# Patient Record
Sex: MALE | HISPANIC OR LATINO | Employment: FULL TIME | URBAN - METROPOLITAN AREA
[De-identification: names, ages, dates, MRNs, and addresses within clinical notes are randomized per-mention and may not be internally consistent; named-entity substitution may affect disease eponyms.]

---

## 2018-11-12 ENCOUNTER — OFFICE VISIT (OUTPATIENT)
Dept: URGENT CARE | Facility: PHYSICIAN GROUP | Age: 28
End: 2018-11-12
Payer: COMMERCIAL

## 2018-11-12 VITALS
BODY MASS INDEX: 24.34 KG/M2 | TEMPERATURE: 97.5 F | SYSTOLIC BLOOD PRESSURE: 124 MMHG | HEART RATE: 87 BPM | RESPIRATION RATE: 16 BRPM | HEIGHT: 70 IN | WEIGHT: 170 LBS | DIASTOLIC BLOOD PRESSURE: 68 MMHG | OXYGEN SATURATION: 95 %

## 2018-11-12 DIAGNOSIS — E10.8 TYPE 1 DIABETES MELLITUS WITH COMPLICATION (HCC): ICD-10-CM

## 2018-11-12 PROCEDURE — 99202 OFFICE O/P NEW SF 15 MIN: CPT | Performed by: EMERGENCY MEDICINE

## 2018-11-12 RX ORDER — INSULIN GLARGINE 100 [IU]/ML
20 INJECTION, SOLUTION SUBCUTANEOUS
Qty: 10 ML | Refills: 2 | Status: SHIPPED | OUTPATIENT
Start: 2018-11-12 | End: 2019-02-07 | Stop reason: SDUPTHER

## 2018-11-12 ASSESSMENT — ENCOUNTER SYMPTOMS
NAUSEA: 0
ABDOMINAL PAIN: 0
CHILLS: 0
COUGH: 0
EYE DISCHARGE: 0
VOMITING: 0
DIARRHEA: 0
SENSORY CHANGE: 0
MYALGIAS: 0
FEVER: 0
EYE REDNESS: 0
SORE THROAT: 0
NERVOUS/ANXIOUS: 0

## 2018-11-12 NOTE — PROGRESS NOTES
"Subjective:      Vamsi Mccullough is a 28 y.o. male who presents with Medication Refill (Insulin 100 unit sub-Q refill for diabetes )            HPI  Patient is a 28-year-old male recently relocated to work for [employer], chest got insurance and needs his insulin medications refilled.  States that he has not run out of them today nor does he have any symptoms.    PMH:  has a past medical history of Diabetes ([facility]).  MEDS:   Current Outpatient Prescriptions:   •  Insulin Lispro (HUMALOG) 100 UNIT/ML Solution Cartridge, Inject  as instructed., Disp: , Rfl:   •  insulin lispro (HUMALOG) 100 UNIT/ML Solution, Inject 5 Units as instructed 3 times a day before meals. Administer according to sliding scale, Disp: 10 mL, Rfl: 2  •  insulin glargine (LANTUS) 100 UNIT/ML Solution, Inject 20 Units as instructed every bedtime., Disp: 10 mL, Rfl: 2  ALLERGIES: No Known Allergies  SURGHX: History reviewed. No pertinent surgical history.  SOCHX:  reports that he has never smoked. He has never used smokeless tobacco.  FH: family history is not on file.  Review of Systems   Constitutional: Negative for chills and fever.   HENT: Negative for sore throat.    Eyes: Negative for discharge and redness.   Respiratory: Negative for cough.    Cardiovascular: Negative for chest pain.   Gastrointestinal: Negative for abdominal pain, diarrhea, nausea and vomiting.   Musculoskeletal: Negative for myalgias.   Skin: Negative for rash.   Neurological: Negative for sensory change.   Psychiatric/Behavioral: The patient is not nervous/anxious.           Objective:     /68 (BP Location: Left arm, Patient Position: Sitting, BP Cuff Size: Adult)   Pulse 87   Temp 36.4 °C (97.5 °F) (Temporal)   Resp 16   Ht 1.778 m (5' 10\")   Wt 77.1 kg (170 lb)   SpO2 95%   BMI 24.39 kg/m²      Physical Exam   Constitutional: He appears well-developed and well-nourished. No distress.   HENT:   Head: Normocephalic and atraumatic.   Right Ear: External ear normal. "   Left Ear: External ear normal.   Eyes: Conjunctivae are normal.   Cardiovascular: Normal rate.    Pulmonary/Chest: Effort normal.   Musculoskeletal: Normal range of motion.   Neurological: He is alert.   Skin: Skin is warm and dry. No rash noted. He is not diaphoretic.   Psychiatric: He has a normal mood and affect. His behavior is normal.   Vitals reviewed.              Assessment/Plan:     1. Type 1 diabetes mellitus with complication (HCC)  2.  Medication refill.    Patient was given a month's worth of medication plus a refill will find himself up with the primary care provider as soon as possible.  Return if any issues develop  - insulin lispro (HUMALOG) 100 UNIT/ML Solution; Inject 5 Units as instructed 3 times a day before meals. Administer according to sliding scale  Dispense: 10 mL; Refill: 2  - insulin glargine (LANTUS) 100 UNIT/ML Solution; Inject 20 Units as instructed every bedtime.  Dispense: 10 mL; Refill: 2

## 2019-02-07 ENCOUNTER — OFFICE VISIT (OUTPATIENT)
Dept: URGENT CARE | Facility: PHYSICIAN GROUP | Age: 29
End: 2019-02-07
Payer: COMMERCIAL

## 2019-02-07 VITALS
TEMPERATURE: 97.3 F | SYSTOLIC BLOOD PRESSURE: 116 MMHG | WEIGHT: 170 LBS | OXYGEN SATURATION: 97 % | HEART RATE: 82 BPM | RESPIRATION RATE: 16 BRPM | DIASTOLIC BLOOD PRESSURE: 82 MMHG | HEIGHT: 70 IN | BODY MASS INDEX: 24.34 KG/M2

## 2019-02-07 DIAGNOSIS — E10.8 TYPE 1 DIABETES MELLITUS WITH COMPLICATION (HCC): ICD-10-CM

## 2019-02-07 PROCEDURE — 99213 OFFICE O/P EST LOW 20 MIN: CPT | Performed by: FAMILY MEDICINE

## 2019-02-07 RX ORDER — INSULIN GLARGINE 100 [IU]/ML
20 INJECTION, SOLUTION SUBCUTANEOUS
Qty: 10 ML | Refills: 2 | Status: SHIPPED | OUTPATIENT
Start: 2019-02-07

## 2019-02-07 NOTE — PROGRESS NOTES
"Subjective:      Vamsi Mccullough is a 28 y.o. male who presents with Medication Refill (needs insuline refilled )  he needs refill. Has not established care yet          HPI    ROS       Objective:     /82 (BP Location: Right arm, Patient Position: Sitting, BP Cuff Size: Adult)   Pulse 82   Temp 36.3 °C (97.3 °F) (Tympanic)   Resp 16   Ht 1.778 m (5' 10\")   Wt 77.1 kg (170 lb)   SpO2 97%   BMI 24.39 kg/m²      Physical Exam   Constitutional: He is oriented to person, place, and time. He appears well-developed and well-nourished. No distress.   HENT:   Head: Normocephalic and atraumatic.   Eyes: Conjunctivae are normal.   Cardiovascular: Normal rate.    Pulmonary/Chest: Effort normal. No respiratory distress.   Neurological: He is alert and oriented to person, place, and time.   Skin: He is not diaphoretic. No pallor.               Assessment/Plan:     1. Type 1 diabetes mellitus with complication (HCC)  - insulin lispro (HUMALOG) 100 UNIT/ML Solution; Inject 5 Units as instructed 3 times a day before meals. Administer according to sliding scale  Dispense: 10 mL; Refill: 2  - insulin glargine (LANTUS) 100 UNIT/ML Solution; Inject 20 Units as instructed every bedtime.  Dispense: 10 mL; Refill: 2    Advised to establish care ASAP  "

## 2022-05-06 ENCOUNTER — OFFICE VISIT (OUTPATIENT)
Dept: URGENT CARE | Facility: PHYSICIAN GROUP | Age: 32
End: 2022-05-06
Payer: COMMERCIAL

## 2022-05-06 VITALS
HEART RATE: 84 BPM | WEIGHT: 182 LBS | OXYGEN SATURATION: 98 % | DIASTOLIC BLOOD PRESSURE: 60 MMHG | SYSTOLIC BLOOD PRESSURE: 134 MMHG | HEIGHT: 69 IN | BODY MASS INDEX: 26.96 KG/M2 | TEMPERATURE: 98.1 F | RESPIRATION RATE: 16 BRPM

## 2022-05-06 DIAGNOSIS — J22 ACUTE RESPIRATORY INFECTION: ICD-10-CM

## 2022-05-06 DIAGNOSIS — Z11.52 ENCOUNTER FOR SCREENING FOR COVID-19: ICD-10-CM

## 2022-05-06 DIAGNOSIS — R68.89 FLU-LIKE SYMPTOMS: ICD-10-CM

## 2022-05-06 LAB
EXTERNAL QUALITY CONTROL: NORMAL
FLUAV+FLUBV AG SPEC QL IA: NORMAL
INT CON NEG: NORMAL
INT CON POS: NORMAL
SARS-COV+SARS-COV-2 AG RESP QL IA.RAPID: NEGATIVE

## 2022-05-06 PROCEDURE — 87804 INFLUENZA ASSAY W/OPTIC: CPT | Performed by: FAMILY MEDICINE

## 2022-05-06 PROCEDURE — 87426 SARSCOV CORONAVIRUS AG IA: CPT | Performed by: FAMILY MEDICINE

## 2022-05-06 PROCEDURE — 99204 OFFICE O/P NEW MOD 45 MIN: CPT | Mod: CS | Performed by: FAMILY MEDICINE

## 2022-05-06 RX ORDER — INSULIN ASPART 100 [IU]/ML
INJECTION, SOLUTION INTRAVENOUS; SUBCUTANEOUS
COMMUNITY
Start: 2022-05-03

## 2022-05-06 RX ORDER — AZITHROMYCIN 250 MG/1
TABLET, FILM COATED ORAL
Qty: 6 TABLET | Refills: 0 | Status: SHIPPED | OUTPATIENT
Start: 2022-05-06 | End: 2022-05-11

## 2022-05-06 NOTE — LETTER
May 6, 2022         Patient: Vamsi Mccullough   YOB: 1990   Date of Visit: 5/6/2022           To Whom it May Concern:    Vamsi Mccullough was seen in my clinic on 5/6/2022.     Please excuse from work for 5/6 and 5/7/22 due to medical condition.    If you have any questions or concerns, please don't hesitate to call.        Sincerely,           Damon Chapa M.D.  Electronically Signed

## 2022-05-07 NOTE — PROGRESS NOTES
Chief Complaint:    Chief Complaint   Patient presents with   • Coronavirus Screening     Symptoms for 2 day       History of Present Illness:    Symptoms since 5/4/22. Has body aches, nasal symptoms with purulent mucus from nose, and sore throat. Occl spits out purulent mucus. No significant cough. Using Dayquil, Nyquil, and Ibuprofen for symptoms. Girlfriend seen here on 5/3/22, had negative Covid test here.      Past Medical History:    Past Medical History:   Diagnosis Date   • Diabetes (HCC)      Past Surgical History:    No past surgical history on file.    Social History:    Social History     Socioeconomic History   • Marital status:      Spouse name: Not on file   • Number of children: Not on file   • Years of education: Not on file   • Highest education level: Not on file   Occupational History   • Not on file   Tobacco Use   • Smoking status: Never Smoker   • Smokeless tobacco: Never Used   Substance and Sexual Activity   • Alcohol use: Not on file   • Drug use: Not on file   • Sexual activity: Not on file   Other Topics Concern   • Not on file   Social History Narrative   • Not on file     Social Determinants of Health     Financial Resource Strain: Not on file   Food Insecurity: Not on file   Transportation Needs: Not on file   Physical Activity: Not on file   Stress: Not on file   Social Connections: Not on file   Intimate Partner Violence: Not on file   Housing Stability: Not on file     Family History:    No family history on file.    Medications:    Current Outpatient Medications on File Prior to Visit   Medication Sig Dispense Refill   • NOVOLOG FLEXPEN 100 UNIT/ML solution for injection ADMINISTER 13 UNITS UNDER THE SKIN THREE TIMES DAILY BEFORE MEALS INJECT. INSULIN PER SLIDING SCALE     • insulin lispro (HUMALOG) 100 UNIT/ML Solution Inject 5 Units as instructed 3 times a day before meals. Administer according to sliding scale 10 mL 2   • insulin glargine (LANTUS) 100 UNIT/ML Solution  Dr. Shelli Saucedo in for delivery. MD assigned apgars. Admission assessment completed as charted. Primary RN informed. Care relinquished. "Inject 20 Units as instructed every bedtime. 10 mL 2   • Insulin Lispro 100 UNIT/ML Solution Cartridge Inject  as instructed.       No current facility-administered medications on file prior to visit.     Allergies:    No Known Allergies      Vitals:    Vitals:    05/06/22 1858   BP: 134/60   Pulse: 84   Resp: 16   Temp: 36.7 °C (98.1 °F)   TempSrc: Temporal   SpO2: 98%   Weight: 82.6 kg (182 lb)   Height: 1.753 m (5' 9\")       Physical Exam:    Constitutional: Vital signs reviewed. Appears well-developed and well-nourished. No acute distress.   Eyes: Sclera white, conjunctivae clear.   ENT: External ears normal. External auditory canals normal without discharge. TMs translucent and non-bulging. Hearing normal. Lips/teeth are normal. Oral mucosa pink and moist. Posterior pharynx: WNL.  Neck: Neck supple.   Cardiovascular: Regular rate and rhythm. No murmur.  Pulmonary/Chest: Respirations non-labored. Clear to auscultation bilaterally.  Musculoskeletal: Normal gait. No muscular atrophy or weakness.  Neurological: Alert and oriented to person, place, and time. Muscle tone normal. Coordination normal.   Skin: No rashes or lesions. Warm, dry, normal turgor.  Psychiatric: Normal mood and affect. Behavior is normal. Judgment and thought content normal.       Diagnostics:    POCT SARS-COV Antigen MILTON (Symptomatic only)  Order: [id number]   Status: Final result     Visible to patient: No (scheduled for 5/7/2022  5:39 PM)     Next appt: None     Dx: Encounter for screening for COVID-19     0 Result Notes     Ref Range & Units  7:38 PM   Internal   Valid    SARS-COV ANTIGEN MILTON Negative, Indeterminate, None Detected, Valid, Invalid, Pass Negative    Resulting Agency  [facility]              Specimen Collected: 05/06/22  7:38 PM Last Resulted: 05/06/22  7:38 PM           POCT Influenza A/B  Order: [id number]   Status: Final result     Visible to patient: No (scheduled for 5/7/2022  5:57 PM)     Next appt: None "     Dx: Flu-like symptoms     0 Result Notes    Component  7:57 PM   Rapid Influenza A-B NEG    Internal Control Positive Valid    Internal Control Negative Valid    Resulting Agency [facility]              Specimen Collected: 22  7:57 PM Last Resulted: 22  7:57 PM             Medical Decision Makin. Flu-like symptoms  - POCT Influenza A/B    2. Encounter for screening for COVID-19  - POCT SARS-COV Antigen MILTON (Symptomatic only)    3. Acute respiratory infection  - azithromycin (ZITHROMAX) 250 MG Tab; 2 TABS BY MOUTH ON DAY 1, 1 TAB ON DAYS 2-5.  Dispense: 6 Tablet; Refill: 0      Work note given - excuse for  and 22.    Discussed with him DDX, management options, and risks, benefits, and alternatives to treatment plan agreed upon.    Symptoms since 22. Has body aches, nasal symptoms with purulent mucus from nose, and sore throat. Occl spits out purulent mucus. No significant cough. Using Dayquil, Nyquil, and Ibuprofen for symptoms. Girlfriend seen here on 5/3/22, had negative Covid test here.    Out of Rapid Strep tests so could not do.    Rapid Flu test is negative.    POC Covid test is negative.    Recommended further observation and see if symptoms will self-resolve as likely viral etiology at this time.    May use OTC meds for symptoms prn.    Back-up Rx for antibiotic he will take if getting much worse or not improving at all at least within 2 weeks of symptoms.    Discussed expected course of duration, time for improvement, and to seek follow-up in Emergency Room, urgent care, or with PCP if getting worse at any time or not improving within expected time frame.

## 2023-05-10 ENCOUNTER — APPOINTMENT (OUTPATIENT)
Dept: RADIOLOGY | Facility: IMAGING CENTER | Age: 33
End: 2023-05-10
Attending: PHYSICIAN ASSISTANT
Payer: COMMERCIAL

## 2023-05-10 ENCOUNTER — OFFICE VISIT (OUTPATIENT)
Dept: URGENT CARE | Facility: PHYSICIAN GROUP | Age: 33
End: 2023-05-10
Payer: COMMERCIAL

## 2023-05-10 VITALS
RESPIRATION RATE: 16 BRPM | HEIGHT: 69 IN | WEIGHT: 174 LBS | DIASTOLIC BLOOD PRESSURE: 82 MMHG | SYSTOLIC BLOOD PRESSURE: 118 MMHG | HEART RATE: 78 BPM | OXYGEN SATURATION: 98 % | TEMPERATURE: 98.2 F | BODY MASS INDEX: 25.77 KG/M2

## 2023-05-10 DIAGNOSIS — M79.641 HAND PAIN, RIGHT: ICD-10-CM

## 2023-05-10 DIAGNOSIS — R22.31 MASS OF RIGHT HAND: ICD-10-CM

## 2023-05-10 PROCEDURE — 73130 X-RAY EXAM OF HAND: CPT | Mod: TC,FY,RT | Performed by: PHYSICIAN ASSISTANT

## 2023-05-10 PROCEDURE — 99213 OFFICE O/P EST LOW 20 MIN: CPT | Performed by: PHYSICIAN ASSISTANT

## 2023-05-10 RX ORDER — INSULIN GLARGINE 100 [IU]/ML
INJECTION, SOLUTION SUBCUTANEOUS
COMMUNITY
Start: 2023-04-03

## 2023-05-10 RX ORDER — BUPROPION HYDROCHLORIDE 150 MG/1
150 TABLET ORAL EVERY 24 HOURS
COMMUNITY
Start: 2023-05-08

## 2023-05-10 ASSESSMENT — ENCOUNTER SYMPTOMS
WEAKNESS: 0
SENSORY CHANGE: 0
FEVER: 0
TINGLING: 0
CHILLS: 0
FOCAL WEAKNESS: 0

## 2023-05-10 NOTE — PROGRESS NOTES
"Subjective     Vamsi Mccullough is a 32 y.o. male who presents with Hand Pain (Right hand pain  onset 4 months )            Patient is here with complaints of hand pain and hard mass on dorsal aspect of right hand and wrist for about 4 months. No previous injury. No skin redness or swelling. Pain radiating into right wrist. Pain is worse with movement and flexion. No numbness or tingling.         Past Medical History:   Diagnosis Date    Diabetes (HCC)          No past surgical history on file.      No family history on file.      Patient has no known allergies.      Medications, Allergies, and current problem list reviewed today in Epic      Review of Systems   Constitutional:  Negative for chills, fever and malaise/fatigue.   Musculoskeletal:  Positive for joint pain (right hand pain. Solid mass in dorsal aspect of proximal hand.).   Skin:  Negative for rash.   Neurological:  Negative for tingling, sensory change, focal weakness and weakness.        All other systems reviewed and are negative.         Objective     /82   Pulse 78   Temp 36.8 °C (98.2 °F) (Temporal)   Resp 16   Ht 1.753 m (5' 9\")   Wt 78.9 kg (174 lb)   SpO2 98%   BMI 25.70 kg/m²      Physical Exam  Constitutional:       General: He is not in acute distress.     Appearance: He is not ill-appearing.   HENT:      Head: Normocephalic and atraumatic.   Eyes:      Conjunctiva/sclera: Conjunctivae normal.   Cardiovascular:      Rate and Rhythm: Normal rate and regular rhythm.   Pulmonary:      Effort: Pulmonary effort is normal. No respiratory distress.      Breath sounds: No stridor. No wheezing.   Musculoskeletal:        Hands:    Skin:     General: Skin is warm and dry.      Findings: No bruising or erythema.   Neurological:      General: No focal deficit present.      Mental Status: He is alert and oriented to person, place, and time.   Psychiatric:         Mood and Affect: Mood normal.         Behavior: Behavior normal.         Thought " Content: Thought content normal.         Judgment: Judgment normal.                     5/10/2023 4:29 PM     HISTORY/REASON FOR EXAM:  Atraumatic Pain/Swelling/Deformity; hand pain, hard mass dorsal aspect of hand mid wrist.        TECHNIQUE/EXAM DESCRIPTION AND NUMBER OF VIEWS:  3 views of the  RIGHT hand.     COMPARISON: None     FINDINGS:     MINERALIZATION: Mineralization is unremarkable for age.     INJURY: No acute fracture or gross malalignment is seen.     JOINTS: No erosive arthropathy is evident.           IMPRESSION:     No radiographic evidence of acute traumatic injury or bony protuberance or erosion. If there is clinical concern for soft tissue mass, MRI without and with contrast would be the study of choice when clinically appropriate. Ultrasound can also be valuable   for early assessment.           Assessment & Plan      1. Hand pain, right  DX-HAND 3+ RIGHT    Referral to Hand Surgery      2. Mass of right hand  DX-HAND 3+ RIGHT    Referral to Hand Surgery          Suspect Ganglion cyst.  Urgent referral placed to follow-up with Orthopedic surgeon.    Differential diagnoses, Supportive care, and indications for immediate follow-up discussed with patient.   Pathogenesis of diagnosis discussed including typical length and natural progression.   Instructed to return to clinic or nearest emergency department for any change in condition, further concerns, or worsening of symptoms.        The patient demonstrated a good understanding and agreed with the treatment plan.      Martha Tomlinson P.A.-C.

## 2025-03-14 ENCOUNTER — OFFICE VISIT (OUTPATIENT)
Dept: URGENT CARE | Facility: PHYSICIAN GROUP | Age: 35
End: 2025-03-14
Payer: COMMERCIAL

## 2025-03-14 VITALS
HEART RATE: 89 BPM | WEIGHT: 175 LBS | OXYGEN SATURATION: 98 % | HEIGHT: 69 IN | BODY MASS INDEX: 25.92 KG/M2 | SYSTOLIC BLOOD PRESSURE: 122 MMHG | RESPIRATION RATE: 16 BRPM | TEMPERATURE: 98.9 F | DIASTOLIC BLOOD PRESSURE: 70 MMHG

## 2025-03-14 DIAGNOSIS — J06.9 VIRAL URI WITH COUGH: ICD-10-CM

## 2025-03-14 PROCEDURE — 99213 OFFICE O/P EST LOW 20 MIN: CPT

## 2025-03-14 PROCEDURE — 3074F SYST BP LT 130 MM HG: CPT

## 2025-03-14 PROCEDURE — 3078F DIAST BP <80 MM HG: CPT

## 2025-03-14 RX ORDER — ALPRAZOLAM 0.5 MG
0.5 TABLET ORAL NIGHTLY PRN
COMMUNITY

## 2025-03-14 RX ORDER — ACYCLOVIR 400 MG/1
TABLET ORAL
COMMUNITY
Start: 2025-02-25

## 2025-03-14 RX ORDER — BENZONATATE 100 MG/1
100 CAPSULE ORAL 3 TIMES DAILY PRN
Qty: 30 CAPSULE | Refills: 0 | Status: SHIPPED | OUTPATIENT
Start: 2025-03-14

## 2025-03-14 RX ORDER — ACYCLOVIR 800 MG/1
TABLET ORAL
COMMUNITY
Start: 2025-01-15

## 2025-03-14 NOTE — LETTER
[facility]  [facility] [address] [address]     March 14, 2025    Patient: Vamsi Mccullough   YOB: 1990   Date of Visit: 3/14/2025       To Whom It May Concern:    Vamsi Mccullough was seen and treated in our department on 3/14/2025. Patient may return to work when he is no longer having fevers for 24 hours.     Sincerely,     Jesus Martino DNP, APRN, FNP-BC

## 2025-03-15 NOTE — PROGRESS NOTES
"Subjective:     Vamsi Mccullough is a 34 y.o. male who presents for Letter for School/Work, Cough (X 2 days), and Headache (Body aches as well )    HPI  Onset 3 days ago. Cough, body aches, headache, scratchy throat, nasal congestion, subjective fevers/chills. Cough is productive. No blood in sputum. He reports mild shortness of breath, coughing fits exacerbate this. Denies hx of asthma or pna. He does smoke marijuana. He has tried Dayquil, Nyquil, motrin which provides some relief. No other aggravating or alleviating factors.    Review of Systems   All other systems reviewed and are negative.    Medications:    ALPRAZolam Tabs  buPROPion  Dexcom G7 Sensor Misc  FreeStyle Debbie 3 Sensor Misc  insulin glargine  insulin glargine Soln  insulin lispro  Insulin Lispro Soct  NovoLOG FlexPen Sopn    Allergies:  Patient has no known allergies.    Past Social Hx:  Vamsi Mccullough  reports that he has never smoked. He has never used smokeless tobacco.    Past Medical Hx:   Past Medical History:   Diagnosis Date    Diabetes (HCC)       Problem list reviewed by myself today in Epic.     Objective:     /70   Pulse 89   Temp 37.2 °C (98.9 °F) (Temporal)   Resp 16   Ht 1.753 m (5' 9\")   Wt 79.4 kg (175 lb)   SpO2 98%   BMI 25.84 kg/m²     Physical Exam  Vitals reviewed.   Constitutional:       Appearance: Normal appearance. He is not toxic-appearing.   HENT:      Head: Normocephalic and atraumatic.      Right Ear: External ear normal. Tympanic membrane is not erythematous or bulging.      Left Ear: External ear normal. Tympanic membrane is not erythematous or bulging.      Nose: Congestion and rhinorrhea present.      Mouth/Throat:      Mouth: Mucous membranes are moist.      Pharynx: Postnasal drip present.   Eyes:      Conjunctiva/sclera: Conjunctivae normal.   Cardiovascular:      Rate and Rhythm: Normal rate.   Pulmonary:      Effort: Pulmonary effort is normal. No respiratory distress.      Breath sounds: No " wheezing, rhonchi or rales.   Musculoskeletal:      Cervical back: Normal range of motion.   Lymphadenopathy:      Cervical: No cervical adenopathy.   Skin:     General: Skin is warm and dry.      Capillary Refill: Capillary refill takes less than 2 seconds.   Neurological:      Mental Status: He is alert.   Psychiatric:         Behavior: Behavior normal.       Assessment/Plan:     Assessment & Plan  Viral URI with cough    Orders:    benzonatate (TESSALON) 100 MG Cap; Take 1 Capsule by mouth 3 times a day as needed for Cough.    VSS. Pt politely deferred viral testing at this time. Suspect viral etiology. Recommend observation to see if symptoms will resolve spontaneously. Advised supportive care and to treat symptoms with medications, may use over-the-counter meds for symptoms as needed. Encouraged rest and adequate fluid intake. Work note provided.    Discussed management options (risks, benefits, and alternatives to treatment). Reasonable side affects and potential adverse effects of medication discussed. Pt expresses understanding and the treatment plan was agreed upon.     Differential diagnosis, natural history, supportive care, and indications for immediate follow-up discussed. Advised the patient to follow-up with PCP for recheck, reevaluation, and consideration of further management. Instructed to go to the nearest Emergency Department or call 911 if symptoms fail to improve, for any change in condition, further concerns, or new concerning symptoms.     This note was electronically signed by Jesus Martino DNP, ROBERT, COLLIN